# Patient Record
(demographics unavailable — no encounter records)

---

## 2024-11-08 NOTE — ASSESSMENT
[FreeTextEntry1] : Mr. ANABELA LEE is a 47 year old man with HTN and GERD is here for evaluation.  #Back pain - suspect myalgias in the setting of viral infection. No improved. No TTP  #Vaping - negative effect of vaping discussed negative effects of vaping discussed. Recommend permanent cessation. Can use nicotine gum as needed to help with cravings PFTs with minimal restrictive defect, otherwise normal Lungs clear on exam -- obtain CXR  All questions answered. Patient in agreement with plan. Follow up as needed if sx persist

## 2024-11-08 NOTE — HISTORY OF PRESENT ILLNESS
[Former] : former [Never] : never [TextBox_4] : Mr. ANABELA LEE is a 47 year old man with HTN and GERD is here for evaluation.    For the past week, has mid back pain in thoraco-lumbar area. Pain is not pleuritic, worse when he is sitting for a long time. Has also been having shoulder/upper back pain since started working out. Taking Advil which helped Has been having sore throat for last week. No cough, no SOB, no wheezing. He is feeling a bit better today Hx of childhood asthma, born premature, no issues through adulthood.  Was vaping nicotine, for about 1/2 year, now stopped.    ROS: denies fevers, chills, night sweats, unintentional weight loss    PMH: HTN, GERD PSH: hernia surgery, shoulder surgery Meds: per chart All: NKDA  SH: occ social smoker; vape user for past 1/2 year. FH: Denies family hx of pulmonary disease PMD: GELY STARKS

## 2024-11-08 NOTE — CONSULT LETTER
[Dear  ___] : Dear  [unfilled], [Consult Letter:] : I had the pleasure of evaluating your patient, [unfilled]. [Please see my note below.] : Please see my note below. [Consult Closing:] : Thank you very much for allowing me to participate in the care of this patient.  If you have any questions, please do not hesitate to contact me. [FreeTextEntry3] : Sincerely,  Daly Brizuela MD [facility] tel: [phone] fax: [phone]

## 2024-12-04 NOTE — ASSESSMENT
[FreeTextEntry1] : 12/04/2024: R SH x-rays, 2 views, reveal negative C-spine x-rays, 2 views, reveal mild arthritic changes Underlying pathology reviewed and treatment options discussed. Start PT and HEP to improve mechanics and reduce pain. Activity modification as tolerated. Prescribed mobic. Consider CSI if sx persist. Questions addressed. Follow up prn.   The documentation recorded by the scribe accurately reflects the service I personally performed and the decisions made by me. KRISTINA, Leah Villegas, attest that this documentation has been prepared under the direction and in the presence of Provider Alex Mcfarlane MD.   The patient was seen by Alex Mcfarlane MD.

## 2024-12-04 NOTE — HISTORY OF PRESENT ILLNESS
[Full time] : Work status: full time [de-identified] : 12/04/2024: 46 y/o RHD male presents with right shoulder pain for the past 3 weeks. States pain started after receiving his flu shot in the right arm. Describes anterior shoulder pain that is worse with ROM. Denies radiating pain. She has been taking Advil without relief. Denies history of prior injury.  Occup:  [] : Post Surgical Visit: no [FreeTextEntry5] : 3 weeks ago pain start after Flu Vaccine  [de-identified] : Acc

## 2024-12-04 NOTE — PHYSICAL EXAM
[Right] : right shoulder [5 ___] : forward flexion 5[unfilled]/5 [5___] : internal rotation 5[unfilled]/5 [] : no sensory deficits [TWNoteComboBox7] : active forward flexion 160 degrees [TWNoteComboBox6] : internal rotation L4 [de-identified] : external rotation 70 degrees

## 2025-07-08 NOTE — REVIEW OF SYSTEMS
[Ear Drainage] : ear drainage [Nasal Congestion] : nasal congestion [Sinus Pressure] : sinus pressure [Anxiety] : anxiety [Depression] : depression [Negative] : Heme/Lymph [de-identified] : Headache

## 2025-07-08 NOTE — HISTORY OF PRESENT ILLNESS
[de-identified] : Mac Baltazar is a 48-year-old male with history of CAD on Plavix who presents for evaluation of intermittent aural fullness. He notes that over the past 2 months he has had intermittent bilateral aural fullness. He notes constant muffled hearing bilaterally. He denies vertigo or tinnitus. He denies otalgia or otorrhea. He denies recurrent ear infections or fever. He denies family history of hearing loss. He denies significant loud noise exposure or ototoxic drug exposure. He denies head trauma. He notes intermittent sinus pressure with nasal congestion and mouth breathing. He notes clear rhinorrhea when eating. He denies postnasal drainage. He denies recurrent sinus infection. He denies jaw tightness. He is unsure if he grinds teeth at night. He has used Flonase intermittently. He has tried breathe right strips with benefit but htey do not stay on. He states he has had remote immunotherapy for allergies that resolved his allergic symptoms. He notes he had URI 2 months ago. He was given antibiotic with improvement in URI symptoms.

## 2025-07-08 NOTE — PHYSICAL EXAM
[Midline] : trachea located in midline position [Normal] : no rashes [Nasal Endoscopy Performed] : nasal endoscopy was performed, see procedure section for findings [de-identified] : Bilateral nasal valve collapse with positive brooks. [de-identified] : Bilateral inferior turbinate hypertrophy.

## 2025-07-08 NOTE — ASSESSMENT
[FreeTextEntry1] : Mac Baltazar presents for evaluation of bilateral aural fullness and muffled hearing. Otoscopic exam wnl. Audiogram was performed and revealed type A tymps AU and hearing wnl AU. He has history of chronic rhinitis. Discussed symptoms are likely due to eustachian tube dysfunction. he has tried flonase before but will use more consistently for a month.  In addition, he notes chronic nasal congestion. On exam, he has bilateral nasal valve collapse. He has tried breathe right strips with benefit but strips do not stay on. sinonasal endoscopy was performed showing bilateral inferior turbinate hypertrophy. We discussed bilateral Vivaer nasal valve repair and inferior turbinate reduction. Risks include but are not limited to bleeding, infection, crusting, scarring, injury to nasal mucosa/skin, nasal swelling, persistence of symptoms, need for future procedure. All questions were answered. Patient will consider and let us know. He will speak with his cardiologist about coming off of plavix for a few days for the procedure.  - start fluticasone 2 sprays to each nostril BID x 1 mo - f/u for procedure.